# Patient Record
Sex: FEMALE | Race: WHITE | Employment: STUDENT | URBAN - METROPOLITAN AREA
[De-identification: names, ages, dates, MRNs, and addresses within clinical notes are randomized per-mention and may not be internally consistent; named-entity substitution may affect disease eponyms.]

---
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
## 2025-02-24 ENCOUNTER — HOSPITAL ENCOUNTER (OUTPATIENT)
Age: 20
Discharge: HOME | End: 2025-02-24
Payer: COMMERCIAL

## 2025-02-24 VITALS
DIASTOLIC BLOOD PRESSURE: 71 MMHG | OXYGEN SATURATION: 100 % | TEMPERATURE: 99 F | SYSTOLIC BLOOD PRESSURE: 119 MMHG | RESPIRATION RATE: 18 BRPM | HEART RATE: 77 BPM

## 2025-02-24 DIAGNOSIS — R04.1 BLOOD IN THROAT: Primary | ICD-10-CM

## 2025-02-24 DIAGNOSIS — Z87.820 HISTORY OF TRAUMATIC BRAIN INJURY: ICD-10-CM

## 2025-02-24 DIAGNOSIS — R09.82 POST-NASAL DRIP: ICD-10-CM

## 2025-02-24 PROCEDURE — 99214 OFFICE O/P EST MOD 30 MIN: CPT | Performed by: NURSE PRACTITIONER

## 2025-02-24 NOTE — ED INITIAL ASSESSMENT (HPI)
Patient had a brain bleed after falling down stairs a weeks ago. Placed on ASA yesterday. Today, spitting up clots a little larger than dime sized.

## 2025-02-24 NOTE — ED PROVIDER NOTES
Patient Seen in: [facility]      History     Chief Complaint   Patient presents with    Bleeding     Stated Complaint: Head Injury    Subjective:   HPI    19 yr old female, here with mom for evaluation of blood tinged mucous she is expectorating x 5 days. She denies feeling or having coughing 1st before this. She has not had any runny nose, congestion, ear pain or fullness, fever or chills, or other viral type symptoms before this starting. Patient had a traumatic fall down stairs 2 weeks ago resulting in a TBI, frontal brain hemorrhage, occipital bone fracture and neurologic symptoms, resulting in a 5 day ICU stay, discharged on 2/14/25 from hospital in [place] where she was in college. She is home now recovering with mom. She reports neurologic symptoms have been slowly resolving and she is feeling generally better. She denies any increased sinus congestion, worst headaches, dizziness, numbness or tingling to extremities, vision changes, drainage from ears or nose, bloody noses, sore throat since symptoms started 5 days ago. 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 They wanted to make sure neuro symptoms were improving prior to doing this. She denies other medications at this time. No new injury.    Objective:     History reviewed. No pertinent past medical history.           History reviewed. No pertinent surgical history.             Social History     Socioeconomic History    Marital status: Single   Tobacco Use    Smoking status: Never    Smokeless tobacco: Never   Vaping Use    Vaping status: Never Used   Substance and Sexual Activity    Alcohol use: Never    Drug use: Never              Review of Systems    Positive for stated complaint: Head Injury  Other systems are as noted in HPI.  Constitutional and vital signs reviewed.      All other systems reviewed and negative except as noted above.    Physical Exam      ED Triage Vitals [02/24/25 1736]   /71   Pulse 77   Resp 18   Temp 98.6 °F (37 °C)   Temp src Oral   SpO2 100 %   O2 Device None (Room air)       Current Vitals:   Vital Signs  BP: 119/71  Pulse: 77  Resp: 18  Temp: 98.6 °F (37 °C)  Temp src: Oral    Oxygen Therapy  SpO2: 100 %  O2 Device: None (Room air)        Physical Exam  Vitals and nursing note reviewed.   Constitutional:       General: She is not in acute distress.     Appearance: Normal appearance. She is not ill-appearing, toxic-appearing or diaphoretic.   HENT:      Head: Normocephalic. No raccoon eyes, Appiah's sign, right periorbital erythema or left periorbital erythema.      Jaw: There is normal jaw occlusion.      Right Ear: Tympanic membrane, ear canal and external ear normal. No drainage. No mastoid tenderness. No hemotympanum. Tympanic membrane is not perforated or bulging.      Left Ear: Tympanic membrane, ear canal and external ear normal. No drainage. No mastoid tenderness. No hemotympanum. Tympanic membrane is not perforated or bulging.      Nose: Nose normal. No nasal deformity, nasal tenderness, congestion or rhinorrhea.      Right Nostril: No epistaxis or septal hematoma.      Left Nostril: No epistaxis or septal hematoma.      Mouth/Throat:      Lips: Pink.      Mouth: Mucous membranes are moist. No oral lesions or angioedema.      Dentition: Normal dentition.      Tongue: No lesions. Tongue does not deviate from midline.      Palate: No mass and lesions.      Pharynx: Oropharynx is clear. Uvula midline. Postnasal drip (dark red/blood noted post nasal drip to left side of posterior pharynx present) present. No pharyngeal swelling, oropharyngeal exudate, posterior oropharyngeal erythema or uvula swelling.      Tonsils: No tonsillar exudate or tonsillar abscesses.   Eyes:      General:         Right eye: No discharge.         Left eye: No discharge.      Extraocular Movements: Extraocular movements intact.      Conjunctiva/sclera:  Conjunctivae normal.      Pupils: Pupils are equal, round, and reactive to light.   Cardiovascular:      Rate and Rhythm: Normal rate.      Pulses: Normal pulses.   Pulmonary:      Effort: Pulmonary effort is normal. No respiratory distress.      Breath sounds: Normal breath sounds. No stridor. No wheezing, rhonchi or rales.   Chest:      Chest wall: No tenderness.   Musculoskeletal:         General: Normal range of motion.      Cervical back: Normal range of motion and neck supple. No rigidity or tenderness.   Lymphadenopathy:      Cervical: No cervical adenopathy.   Skin:     General: Skin is warm.      Coloration: Skin is not jaundiced or pale.      Findings: No erythema or rash.   Neurological:      General: No focal deficit present.      Mental Status: She is alert and oriented to person, place, and time.      Cranial Nerves: No cranial nerve deficit, dysarthria or facial asymmetry.      Sensory: Sensation is intact.      Coordination: Coordination is intact.      Gait: Gait is intact. Gait normal.   Psychiatric:         Mood and Affect: Mood normal.         Behavior: Behavior normal.             ED Course   Labs Reviewed - No data to display                MDM     19 yr old female here for blood in mucous she is expectorating x 5 days. HX TBI with occipital fracture, frontal brain hemorrhage in ICU and hospital for 5 days dc on 2/14/25 in [place], home here in [place] recovering with mom.    ON exam, pt well appearing talking in full sentences in no resp distress. BL TM normal, no bleeding, no perforation, no drainage in canals. No mastoid tenderness. Pupils perrla intact EOM no nystagmus. Good eye movement with no entrapment, periorbital swelling or edema. No racoon eyes, hughes sign noted. Full ROM to neck. Lungs clear with no wheezing crackles stridor good air movement with no hypoxia. PT is not congested with no rhinorrhea or epistaxis. FULL ROM to extremities with no sensation deficit.     Differential  diagnoses reflecting the complexity of care include but are not limited to sinus congestion due to injury, sinusitis, orbital fracture, sinus fracture, hemorrage related to injury.    Comorbidities that add complexity to management include: TBI recent hemorrhage, occipital fracture, ICU stay  History obtained by an independent source was from: patient, mom  My independent interpretations of studies include: none  Shared decision making was done by: patient, mom, myself  Discussions of management was done with: patient, mom, Dr Tamayo attending at [facility]    Patient is well appearing, non-toxic and in no acute distress.  Vital signs are stable.   Discussed this likely is related to sinus draining, possible related to inflammation of sinuses from injury and now swelling down so sinus draining. ON MRI and CT results mom pulled up. Noted blood products in ethmoid sinuses. This can be related to symptoms she is concerned about. There is no noted on results of sinus fracture, nasal fracture, or orbital bone fracture.    Discussed calling neurology to discuss symptoms and if they're more concerned to get imaging to re-evaluate this. Discussed fu with PCP for re-evaluation of this as they can do outpatient imaging if pt is not having any new or worsening neuro symptoms.   Discussed strict ER precautions.    Mom and pt agree  with plan.  All questions answered. Return and ER precautions given.    Counseled: Patient, regarding diagnosis, regarding treatment plan, regarding diagnostic results, regarding prescription, I have discussed with the patient the results of tests, differential diagnosis, and warning signs and symptoms that should prompt immediate return. The patient understands these instructions and agrees to the follow-up plan provided. There is no barriers to learning. Appropriate f/u given. Patient agrees to return for any concerns/ problems/complications.        Medical Decision Making      Disposition and Plan      Clinical Impression:  1. Blood in throat    2. Post-nasal drip    3. History of traumatic brain injury         Disposition:  Discharge  2/24/2025  6:25 pm    Follow-up:  No follow-up provider specified.        Medications Prescribed:  There are no discharge medications for this patient.          Supplementary Documentation:

## 2025-02-25 NOTE — DISCHARGE INSTRUCTIONS
Follow-up with your neurologist tomorrow and primary care provider.  Let them know the symptoms you are experiencing including blood in your mucus while expectorating.  On exam today I noticed blood to your posterior pharynx or throat that appears like it is dripping from your sinuses, like postnasal drip.    On your CT and MRI that were performed during your hospital stay it states blood products in your ethmoid sinuses but no obvious fractures that are seen.  This symptom you are experiencing could be related to this but should be verified with neurology.    Further imaging of your sinuses, facial bones or head can be ordered by your primary care provider to reevaluate if symptoms persist.    If you are having any worsening symptoms like dizziness, vision changes, ringing in your ears, fluid from your ears, bloody nose, worsening blood in your throat, cough with blood in your sputum, worst headache of your life, numbness or tingling to extremities please go right to the ER for reevaluation